# Patient Record
Sex: FEMALE | Race: WHITE | Employment: OTHER | URBAN - METROPOLITAN AREA
[De-identification: names, ages, dates, MRNs, and addresses within clinical notes are randomized per-mention and may not be internally consistent; named-entity substitution may affect disease eponyms.]

---

## 2021-01-19 PROBLEM — R97.0 ELEVATED CEA: Status: ACTIVE | Noted: 2020-05-20

## 2021-01-19 PROBLEM — K86.1 IDIOPATHIC CHRONIC PANCREATITIS (HCC): Status: ACTIVE | Noted: 2020-11-18

## 2021-01-19 PROBLEM — K86.89 PANCREATIC MASS: Status: ACTIVE | Noted: 2020-03-24

## 2021-01-19 PROBLEM — K83.8 DILATED BILE DUCT: Status: ACTIVE | Noted: 2020-11-18

## 2021-06-14 PROBLEM — K86.89 DILATED PANCREATIC DUCT: Status: ACTIVE | Noted: 2020-11-18

## 2022-02-16 ENCOUNTER — ANESTHESIA EVENT (OUTPATIENT)
Dept: OPERATING ROOM | Age: 77
End: 2022-02-16
Payer: MEDICARE

## 2022-02-17 ENCOUNTER — ANESTHESIA (OUTPATIENT)
Dept: OPERATING ROOM | Age: 77
End: 2022-02-17
Payer: MEDICARE

## 2022-02-17 ENCOUNTER — HOSPITAL ENCOUNTER (OUTPATIENT)
Age: 77
Setting detail: OUTPATIENT SURGERY
Discharge: HOME | End: 2022-02-17
Attending: OPHTHALMOLOGY | Admitting: OPHTHALMOLOGY
Payer: MEDICARE

## 2022-02-17 VITALS
SYSTOLIC BLOOD PRESSURE: 174 MMHG | BODY MASS INDEX: 28.72 KG/M2 | OXYGEN SATURATION: 97 % | WEIGHT: 152 LBS | HEART RATE: 71 BPM | TEMPERATURE: 97.2 F | RESPIRATION RATE: 18 BRPM | DIASTOLIC BLOOD PRESSURE: 80 MMHG

## 2022-02-17 VITALS — DIASTOLIC BLOOD PRESSURE: 89 MMHG | SYSTOLIC BLOOD PRESSURE: 173 MMHG | OXYGEN SATURATION: 100 %

## 2022-02-17 PROBLEM — H33.001 MACULA-OFF RHEGMATOGENOUS RETINAL DETACHMENT, RIGHT: Status: ACTIVE | Noted: 2022-02-17

## 2022-02-17 LAB
GFR NON-AFRICAN AMERICAN: 35 ML/MIN
GFR SERPL CREATININE-BSD FRML MDRD: 43 ML/MIN
GFR SERPL CREATININE-BSD FRML MDRD: ABNORMAL ML/MIN/{1.73_M2}
GLUCOSE BLD-MCNC: 108 MG/DL (ref 74–100)
POC BUN: 20 MG/DL (ref 8–26)
POC CREATININE: 1.45 MG/DL (ref 0.51–1.19)

## 2022-02-17 PROCEDURE — 2500000003 HC RX 250 WO HCPCS: Performed by: OPHTHALMOLOGY

## 2022-02-17 PROCEDURE — 3700000000 HC ANESTHESIA ATTENDED CARE: Performed by: OPHTHALMOLOGY

## 2022-02-17 PROCEDURE — 6370000000 HC RX 637 (ALT 250 FOR IP): Performed by: OPHTHALMOLOGY

## 2022-02-17 PROCEDURE — 7100000010 HC PHASE II RECOVERY - FIRST 15 MIN: Performed by: OPHTHALMOLOGY

## 2022-02-17 PROCEDURE — 84520 ASSAY OF UREA NITROGEN: CPT

## 2022-02-17 PROCEDURE — 2709999900 HC NON-CHARGEABLE SUPPLY: Performed by: OPHTHALMOLOGY

## 2022-02-17 PROCEDURE — 93005 ELECTROCARDIOGRAM TRACING: CPT | Performed by: ANESTHESIOLOGY

## 2022-02-17 PROCEDURE — 6360000002 HC RX W HCPCS: Performed by: OPHTHALMOLOGY

## 2022-02-17 PROCEDURE — 2720000010 HC SURG SUPPLY STERILE: Performed by: OPHTHALMOLOGY

## 2022-02-17 PROCEDURE — 7100000011 HC PHASE II RECOVERY - ADDTL 15 MIN: Performed by: OPHTHALMOLOGY

## 2022-02-17 PROCEDURE — 2580000003 HC RX 258: Performed by: OPHTHALMOLOGY

## 2022-02-17 PROCEDURE — 3600000014 HC SURGERY LEVEL 4 ADDTL 15MIN: Performed by: OPHTHALMOLOGY

## 2022-02-17 PROCEDURE — 82947 ASSAY GLUCOSE BLOOD QUANT: CPT

## 2022-02-17 PROCEDURE — 6360000002 HC RX W HCPCS: Performed by: ANESTHESIOLOGY

## 2022-02-17 PROCEDURE — 2580000003 HC RX 258: Performed by: ANESTHESIOLOGY

## 2022-02-17 PROCEDURE — 3600000004 HC SURGERY LEVEL 4 BASE: Performed by: OPHTHALMOLOGY

## 2022-02-17 PROCEDURE — 6360000002 HC RX W HCPCS

## 2022-02-17 PROCEDURE — 82565 ASSAY OF CREATININE: CPT

## 2022-02-17 PROCEDURE — 3700000001 HC ADD 15 MINUTES (ANESTHESIA): Performed by: OPHTHALMOLOGY

## 2022-02-17 PROCEDURE — 2500000003 HC RX 250 WO HCPCS

## 2022-02-17 RX ORDER — SODIUM CHLORIDE, SODIUM LACTATE, POTASSIUM CHLORIDE, CALCIUM CHLORIDE 600; 310; 30; 20 MG/100ML; MG/100ML; MG/100ML; MG/100ML
INJECTION, SOLUTION INTRAVENOUS CONTINUOUS PRN
Status: DISCONTINUED | OUTPATIENT
Start: 2022-02-17 | End: 2022-02-17 | Stop reason: SDUPTHER

## 2022-02-17 RX ORDER — CYCLOPENTOLATE HYDROCHLORIDE 10 MG/ML
1 SOLUTION/ DROPS OPHTHALMIC EVERY 5 MIN PRN
Status: DISCONTINUED | OUTPATIENT
Start: 2022-02-17 | End: 2022-02-17 | Stop reason: HOSPADM

## 2022-02-17 RX ORDER — CEFTAZIDIME 1 G/1
INJECTION, POWDER, FOR SOLUTION INTRAMUSCULAR; INTRAVENOUS PRN
Status: DISCONTINUED | OUTPATIENT
Start: 2022-02-17 | End: 2022-02-17 | Stop reason: ALTCHOICE

## 2022-02-17 RX ORDER — SODIUM CHLORIDE 0.9 % (FLUSH) 0.9 %
5-40 SYRINGE (ML) INJECTION EVERY 12 HOURS SCHEDULED
Status: DISCONTINUED | OUTPATIENT
Start: 2022-02-17 | End: 2022-02-17 | Stop reason: HOSPADM

## 2022-02-17 RX ORDER — PROPOFOL 10 MG/ML
INJECTION, EMULSION INTRAVENOUS PRN
Status: DISCONTINUED | OUTPATIENT
Start: 2022-02-17 | End: 2022-02-17 | Stop reason: SDUPTHER

## 2022-02-17 RX ORDER — PHENYLEPHRINE HYDROCHLORIDE 100 MG/ML
1 SOLUTION/ DROPS OPHTHALMIC EVERY 5 MIN PRN
Status: DISCONTINUED | OUTPATIENT
Start: 2022-02-17 | End: 2022-02-17 | Stop reason: HOSPADM

## 2022-02-17 RX ORDER — ERYTHROMYCIN 5 MG/G
OINTMENT OPHTHALMIC PRN
Status: DISCONTINUED | OUTPATIENT
Start: 2022-02-17 | End: 2022-02-17 | Stop reason: ALTCHOICE

## 2022-02-17 RX ORDER — HYDRALAZINE HYDROCHLORIDE 20 MG/ML
5 INJECTION INTRAMUSCULAR; INTRAVENOUS EVERY 5 MIN PRN
Status: COMPLETED | OUTPATIENT
Start: 2022-02-17 | End: 2022-02-17

## 2022-02-17 RX ORDER — TOBRAMYCIN AND DEXAMETHASONE 3; 1 MG/ML; MG/ML
1 SUSPENSION/ DROPS OPHTHALMIC
Status: DISCONTINUED | OUTPATIENT
Start: 2022-02-17 | End: 2022-02-17 | Stop reason: HOSPADM

## 2022-02-17 RX ORDER — SODIUM CHLORIDE 0.9 % (FLUSH) 0.9 %
5-40 SYRINGE (ML) INJECTION PRN
Status: DISCONTINUED | OUTPATIENT
Start: 2022-02-17 | End: 2022-02-17 | Stop reason: HOSPADM

## 2022-02-17 RX ORDER — LIDOCAINE HYDROCHLORIDE 10 MG/ML
INJECTION, SOLUTION EPIDURAL; INFILTRATION; INTRACAUDAL; PERINEURAL PRN
Status: DISCONTINUED | OUTPATIENT
Start: 2022-02-17 | End: 2022-02-17 | Stop reason: SDUPTHER

## 2022-02-17 RX ORDER — CYCLOPENTOLATE HYDROCHLORIDE 10 MG/ML
SOLUTION/ DROPS OPHTHALMIC PRN
Status: DISCONTINUED | OUTPATIENT
Start: 2022-02-17 | End: 2022-02-17 | Stop reason: ALTCHOICE

## 2022-02-17 RX ORDER — SODIUM CHLORIDE 9 MG/ML
25 INJECTION, SOLUTION INTRAVENOUS PRN
Status: DISCONTINUED | OUTPATIENT
Start: 2022-02-17 | End: 2022-02-17 | Stop reason: HOSPADM

## 2022-02-17 RX ADMIN — SODIUM CHLORIDE, POTASSIUM CHLORIDE, SODIUM LACTATE AND CALCIUM CHLORIDE: 600; 310; 30; 20 INJECTION, SOLUTION INTRAVENOUS at 13:45

## 2022-02-17 RX ADMIN — HYDRALAZINE HYDROCHLORIDE 5 MG: 20 INJECTION INTRAMUSCULAR; INTRAVENOUS at 14:54

## 2022-02-17 RX ADMIN — HYDRALAZINE HYDROCHLORIDE 5 MG: 20 INJECTION INTRAMUSCULAR; INTRAVENOUS at 14:59

## 2022-02-17 RX ADMIN — CYCLOPENTOLATE HYDROCHLORIDE 1 DROP: 10 SOLUTION/ DROPS OPHTHALMIC at 12:09

## 2022-02-17 RX ADMIN — PHENYLEPHRINE HYDROCHLORIDE 1 DROP: 100 SOLUTION/ DROPS OPHTHALMIC at 12:00

## 2022-02-17 RX ADMIN — PHENYLEPHRINE HYDROCHLORIDE 1 DROP: 100 SOLUTION/ DROPS OPHTHALMIC at 12:09

## 2022-02-17 RX ADMIN — PROPOFOL 60 MG: 10 INJECTION, EMULSION INTRAVENOUS at 13:45

## 2022-02-17 RX ADMIN — CYCLOPENTOLATE HYDROCHLORIDE 1 DROP: 10 SOLUTION/ DROPS OPHTHALMIC at 12:00

## 2022-02-17 RX ADMIN — LIDOCAINE HYDROCHLORIDE 50 MG: 10 INJECTION, SOLUTION EPIDURAL; INFILTRATION; INTRACAUDAL; PERINEURAL at 13:45

## 2022-02-17 ASSESSMENT — PAIN SCALES - GENERAL
PAINLEVEL_OUTOF10: 0

## 2022-02-17 ASSESSMENT — PULMONARY FUNCTION TESTS
PIF_VALUE: 0

## 2022-02-17 NOTE — PROGRESS NOTES
RN called Dr. Eliceo Robert to update him on current BP after giving hydralazine x2. Okay to go home.

## 2022-02-17 NOTE — ANESTHESIA PRE PROCEDURE
Department of Anesthesiology  Preprocedure Note       Name:  Eric Hammonds   Age:  78 y.o.  :  1945                                          MRN:  [id number]         Date:  2022      Surgeon: Shyanne Fink):  Nany Richter MD    Procedure: Procedure(s):  VITRECTOMY 23 GAUGE, GAS FLUID EXCHANGE, ENDOLASER,  POSSIBLE SCLERAL BUCKLE, POSSIBLE SILICONE OIL INJECTION    Medications prior to admission:   Prior to Admission medications    Medication Sig Start Date End Date Taking?  Authorizing Provider   allopurinol (ZYLOPRIM) 100 MG tablet Take 1 tablet by mouth daily 21  Yes MARLENY García CNP   losartan (COZAAR) 50 MG tablet Take 1 tablet by mouth 2 times daily 21  Yes MARLENY García CNP   metoprolol tartrate (LOPRESSOR) 50 MG tablet Take 1 tablet by mouth 2 times daily 21  Yes MARLENY García CNP   SYNTHROID 25 MCG tablet Take 1 tablet by mouth every other day 21  Yes Gamaliel Boyle MD   SYNTHROID 175 MCG tablet Take 1 tablet by mouth Daily 21  Yes Gamaliel Boyle MD   acetaminophen (TYLENOL) 500 MG tablet Take by mouth   Yes Historical Provider, MD   tiZANidine (ZANAFLEX) 2 MG tablet Take 1 tablet by mouth nightly as needed (muscle spasms) 21  Yes MARLENY García CNP   aspirin 81 MG EC tablet Take 81 mg by mouth daily   Yes Historical Provider, MD   Multiple Vitamins-Minerals (PRESERVISION AREDS 2+MULTI VIT) CAPS 1 capsule   Yes Historical Provider, MD       Current medications:    Current Facility-Administered Medications   Medication Dose Route Frequency Provider Last Rate Last Admin    tobramycin-dexamethasone (TOBRADEX) ophthalmic suspension 1 drop  1 drop [facility] On [facility] to [facility] Reina Rogel MD        phenylephrine (VERÓNICA-SYNEPHRINE) 10 % ophthalmic solution 1 drop  1 drop [facility] Q5 Min PRN Nany Richter MD   1 drop at 22 1209    cyclopentolate (CYCLOGYL) 1 % ophthalmic solution 1 drop  1 drop Right [facility] Q5 Min PRN Marilia Villa Mariluz Johnson MD   1 drop at 22 1209       Allergies:     Allergies   Allergen Reactions    Norvasc [Amlodipine]     Vytorin [Ezetimibe-Simvastatin]        Problem List:    Patient Active Problem List   Diagnosis Code    Mass of breast N63.0    Other long term (current) drug therapy Z79.899    Hyperuricemia E79.0    Jaundice R17    Gout M10.9    Disc degeneration, lumbar M51.36    Osteoarthritis M19.90    Hearing loss H91.90    Hypothyroidism E03.9    Calculus in biliary tract K80.20    Hypertension I10    Dilated bile duct K83.8    Elevated CEA R97.0    Idiopathic chronic pancreatitis (HCC) K86.1    Pancreatic mass K86.89    Dilated pancreatic duct K86.89    Macula-off rhegmatogenous retinal detachment, 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       Past Medical History:        Diagnosis Date    Calculus in biliary tract     Disc degeneration, lumbar     Gout     Hearing loss     Hypertension     Hyperuricemia     Hypothyroidism     Jaundice     Mass of breast     Osteoarthritis     Other long term (current) drug therapy     Pancreatitis, chronic (HCC)        Past Surgical History:        Procedure Laterality Date    APPENDECTOMY      BACK SURGERY  2013    lumbar spinal fusion at [facility]  2016    left breast needle loc bx    CATARACT REMOVAL      bilateral     SECTION      x1    CHOLECYSTECTOMY      COLONOSCOPY N/A 2021    COLORECTAL CANCER SCREENING, HIGH RISK performed by Alexandre Yadav MD at [facility] ABDOMINAL         Social History:    Social History     Tobacco Use    Smoking status: Never Smoker    Smokeless tobacco: Never Used   Substance Use Topics    Alcohol use: Yes     Comment: occasional/social                                Counseling given: Not Answered      Vital Signs (Current):   Vitals:    22 1156   BP: (!) 204/95   Pulse: 69   Resp: 14   Temp: 97.7 °F (36.5 °C)   TempSrc: Temporal   SpO2: 98%   Weight: 152 lb (68.9 kg)                                              BP Readings from Last 3 Encounters:   02/17/22 (!) 204/95   12/14/21 (!) 153/93   06/29/21 (!) 147/79       NPO Status: Time of last liquid consumption: 1900                        Time of last solid consumption: 1900                        Date of last liquid consumption: 02/16/22                        Date of last solid food consumption: 02/16/22    BMI:   Wt Readings from Last 3 Encounters:   02/17/22 152 lb (68.9 kg)   12/14/21 151 lb 12.8 oz (68.9 kg)   06/29/21 149 lb 9.6 oz (67.9 kg)     Body mass index is 28.72 kg/m².     CBC:   Lab Results   Component Value Date    WBC 6.9 06/18/2021    RBC 3.71 06/18/2021    HGB 12.0 06/18/2021    HCT 36.0 06/18/2021    MCV 97 06/18/2021     06/18/2021       CMP:   Lab Results   Component Value Date     06/18/2021    K 4.9 06/18/2021     06/18/2021    CO2 23 06/18/2021    BUN 23 06/18/2021    CREATININE 1.45 02/17/2022    CREATININE 1.63 06/18/2021    LABGLOM 35 02/17/2022    GLUCOSE 111 06/18/2021    PROT 7.2 06/18/2021    CALCIUM 9.6 06/18/2021    BILITOT 1.4 06/18/2021    ALKPHOS 82 06/18/2021    AST 43 06/18/2021    ALT 43 06/18/2021       POC Tests:   Recent Labs     02/17/22  1200   POCGLU 108*   POCBUN 20       Coags:   Lab Results   Component Value Date    PROTIME 13.3 04/01/2021    INR 1.00 04/01/2021       HCG (If Applicable): No results found for: PREGTESTUR, PREGSERUM, HCG, HCGQUANT     ABGs: No results found for: PHART, PO2ART, HWY0VPQ, TXE4NIN, BEART, U9INRCDX     Type & Screen (If Applicable):  No results found for: LABABO, LABRH    Drug/Infectious Status (If Applicable):  No results found for: HIV, HEPCAB    COVID-19 Screening (If Applicable): No results found for: COVID19        Anesthesia Evaluation  Patient summary reviewed and Nursing notes reviewed no history of anesthetic complications:   Airway: Mallampati: II  TM distance: >3 FB   Neck ROM: full  Mouth opening: > = 3 FB Dental: normal exam         Pulmonary:Negative Pulmonary ROS and normal exam                               Cardiovascular:  Exercise tolerance: good (>4 METS),   (+) hypertension:,     (-) past MI, CAD, CABG/stent, dysrhythmias,  angina and  CHF      Rhythm: regular  Rate: normal           Beta Blocker:  Dose within 24 Hrs         Neuro/Psych:   Negative Neuro/Psych ROS              GI/Hepatic/Renal: Neg GI/Hepatic/Renal ROS            Endo/Other:    (+) hypothyroidism::., .                 Abdominal:             Vascular: Other Findings:             Anesthesia Plan      general     ASA 2       Induction: intravenous. MIPS: Postoperative opioids intended and Prophylactic antiemetics administered. Anesthetic plan and risks discussed with patient.       Plan discussed with CRNA and surgical team.                  Scout MD Rigo   2/17/2022

## 2022-02-17 NOTE — OP NOTE
Operative Note      Patient: Ken Causey  YOB: 1945  MRN: [id number]    Date of Procedure: 2022      Op Note    [unfilled]    Date of Birth:  @@    @MRN@    Pre-operative Diagnosis: RETINAL DETACHMENT RIGHT EYE    Post-operative Diagnosis: Same    Procedure: Vitrectomy, Gas-Fluid Exchange, Endolaser Right Eye    Anesthesia: [facility]    Surgeons/Assistants: Charis Duarte MD    Estimated Blood Loss: Less than 1 ML    Complications: None    Specimens: None    Reason for Operation:    Patient wishes to proceed with surgery in order to prevent further vision loss. Patient understands vision may not return to normal, and may take 6 - 12 months to reach maximum level. Patient understands risks include bleeding, infection and the possibility of recurrent vision loss. Procedure: The patient was brought to the operating room in good condition. Transient Propofol was given. Retrobulbar and topical anaesthesia were administered. The patient was prepped and draped in the usual manner. 25 gauge vitrectomy trocars were inserted in the usual quadrants. Infusion was inserted in the inferior temporal quadrant. Light pipe and ocutome placed through the superior trocars. Vitreous was removed from anterior to posterior and trimmed out past the equator in all quadrants. Scleral depression was done in the inferior 180 degrees to trim vitreous near the vitreous base. Scleral depression identified retinal break at 9 . A retinotomy was created near the posterior pole at 8:30 o'clock with intraocular diathermy. An air-fluid exchange reattached the retina nicely. Endolaser was placed around all retinal breaks, the retinotomy and 360 degrees from the equator to the ora except for 3 o'clock. Residual fluid was aspirated from over the optic nerve. The air was exchanged for 16% C3F8. The sites were sutured if necessary and SubTenons antibiotic injected in the inferior temporal quadrant.  The eye was patched in the usual fashion and the patient taken to the recovery room in good condition.       Electronically signed by Анна Paula MD

## 2022-02-17 NOTE — H&P
History and Physical    Pt Name: Winifred Lange  MRN: [id number]  YOB: 1945  Date of evaluation: 2/17/2022  Primary Care Physician: MARLENY Garza CNP    SUBJECTIVE:   History of Chief Complaint:    Winifred Lange is a 78 y.o. female who is scheduled today for VITRECTOMY 23 GAUGE, GAS FLUID EXCHANGE, ENDOLASER,  POSSIBLE SCLERAL BUCKLE, POSSIBLE SILICONE OIL INJECTION - Right. She complains of intermittent visual disturbances, \"spotting\" but significantly worse the past 2 weeks and describes a curtain over her vision, reports only some right lower visual field visibility. Allergies  is allergic to norvasc [amlodipine] and vytorin [ezetimibe-simvastatin]. Medications  Prior to Admission medications    Medication Sig Start Date End Date Taking?  Authorizing Provider   allopurinol (ZYLOPRIM) 100 MG tablet Take 1 tablet by mouth daily 12/14/21  Yes Millicent Duverney, APRN - CNP   losartan (COZAAR) 50 MG tablet Take 1 tablet by mouth 2 times daily 12/14/21  Yes Millicent Duverney, APRN - CNP   metoprolol tartrate (LOPRESSOR) 50 MG tablet Take 1 tablet by mouth 2 times daily 12/14/21  Yes Millicent Duverney, APRN - CNP   SYNTHROID 25 MCG tablet Take 1 tablet by mouth every other day 9/8/21  Yes Bobby Morrow MD   SYNTHROID 175 MCG tablet Take 1 tablet by mouth Daily 9/8/21  Yes oBbby Morrow MD   acetaminophen (TYLENOL) 500 MG tablet Take by mouth   Yes Historical Provider, MD   tiZANidine (ZANAFLEX) 2 MG tablet Take 1 tablet by mouth nightly as needed (muscle spasms) 6/14/21  Yes Millicent Duverney, APRN - CNP   aspirin 81 MG EC tablet Take 81 mg by mouth daily   Yes Historical Provider, MD   Multiple Vitamins-Minerals (PRESERVISION AREDS 2+MULTI VIT) CAPS 1 capsule   Yes Historical Provider, MD     Past Medical History    has a past medical history of Calculus in biliary tract, Disc degeneration, lumbar, Gout, Hearing loss, Hypertension, Hyperuricemia, Hypothyroidism, Jaundice, Mass of breast, Osteoarthritis, Other long term (current) drug therapy, and Pancreatitis, chronic ([facility] [address] Past Surgical History   has a past surgical history that includes Cataract removal; Hysterectomy, total abdominal; Appendectomy;  section; back surgery (); Breast surgery (2016); Cholecystectomy; and Colonoscopy (N/A, 2021). Social History   reports that she has never smoked. She has never used smokeless tobacco.   reports current alcohol use. reports no history of drug use. Marital Status    Children 3  Occupation retired nurse, ICU and ER, from [employer]  Family History  Family Status   Relation Name Status    Mother   at age 80    Father     [place] Sister     [address] Other  (Not Specified)     family history includes Breast Cancer in her sister; Cancer in her brother; Diabetes in her father and mother; Heart Attack in her father; Other in an other family member; Stroke in her mother. Review of Systems:  CONSTITUTIONAL:   negative for fevers, chills, fatigue and malaise    EYES:   See hpi   HEENT:   negative for tinnitus, epistaxis and sore throat     RESPIRATORY:   negative for cough, shortness of breath, wheezing     CARDIOVASCULAR:   negative for chest pain, palpitations, syncope, edema  +self reports white coat syndrome   GASTROINTESTINAL:   negative for nausea, vomiting     GENITOURINARY:   negative for incontinence     MUSCULOSKELETAL:   +chronic back pain   NEUROLOGICAL:   Negative for weakness and tingling  negative for headaches and dizziness     PSYCHIATRIC:   negative for anxiety       OBJECTIVE:   VITALS:  weight is 152 lb (68.9 kg). Her temporal temperature is 97.7 °F (36.5 °C). Her blood pressure is 204/95 (abnormal) and her pulse is 69. Her respiration is 14 and oxygen saturation is 98%. CONSTITUTIONAL:alert & oriented x 3, no acute distress. Mildly anxious affect. Very pleasant.    SKIN:  Warm and dry, no rashes on exposed areas of skin   HEAD:  Normocephalic, atraumatic   EYES: EOMs intact. Right pupil dilated s/p eye drops   EARS:  Equal bilaterally, no edema or thickening, skin is intact without lumps or lesions. No discharge. Hearing grossly WNL. NOSE:  Nares patent. No rhinorrhea   MOUTH/THROAT:  Mucous membranes moist, tongue is pink, uvula midline, teeth appear to be intact  NECK:supple, full ROM  LUNGS: Respirations even and non-labored. Clear to auscultation bilaterally, no wheezes, rales, or rhonchi. CARDIOVASCULAR: Regular rate and rhythm, no murmurs/rubs/gallops   ABDOMEN: soft, non-tender, non-distended, bowel sounds active x 4   EXTREMITIES: No edema bilateral lower extremities. No varicosities bilateral lower extremities. NEUROLOGIC: CN II-XII are grossly intact. Gait not assessed.    IMPRESSIONS:   Retinal detachment- OD  PLANS:   VITRECTOMY 23 GAUGE, GAS FLUID EXCHANGE, ENDOLASER,  POSSIBLE SCLERAL BUCKLE, POSSIBLE SILICONE OIL INJECTION - Right        MARLENY AMADOR - CNP   Electronically signed 2/17/2022 at 12:15 PM

## 2022-02-17 NOTE — ANESTHESIA POSTPROCEDURE EVALUATION
Department of Anesthesiology  Postprocedure Note    Patient: Rigoberto Kaufman  MRN: [id number]  YOB: 1945  Date of evaluation: 2/17/2022  Time:  5:08 PM     Procedure Summary     Date: 02/17/22 Room / Location: [address]    Anesthesia Start: 0883 Anesthesia Stop: 1235    Procedure: VITRECTOMY 23 GAUGE, AIR FLUID EXCHANGE, ENDOLASER 200MS 200MW 473 SPOTS, AIR GAS EXCHANGE WITH 16% C3F8 (Right Eye) Diagnosis: (RETINAL DETACHMENT RIGHT EYE)    Surgeons: Yehuda Talbot MD Responsible Provider: Tip Romano MD    Anesthesia Type: general ASA Status: 2          Anesthesia Type: general    Linda Phase I:      Linda Phase II: Linda Score: 10    Last vitals: Reviewed and per EMR flowsheets.        Anesthesia Post Evaluation    Patient location during evaluation: PACU  Patient participation: complete - patient participated  Level of consciousness: awake and alert  Pain score: 0  Airway patency: patent  Nausea & Vomiting: no nausea and no vomiting  Complications: no  Cardiovascular status: hemodynamically stable  Respiratory status: room air  Hydration status: euvolemic

## 2022-02-17 NOTE — PROGRESS NOTES
RN spoke to Dr. Jaron Pagan regarding pts elevated BP. She stated that it is always like this because she doesn't absorb her medications well. He stated to give 5mg of hydralazine every 5 mins for 2 doses.

## 2022-02-18 LAB
EKG ATRIAL RATE: 67 BPM
EKG P AXIS: 8 DEGREES
EKG P-R INTERVAL: 184 MS
EKG Q-T INTERVAL: 462 MS
EKG QRS DURATION: 128 MS
EKG QTC CALCULATION (BAZETT): 488 MS
EKG R AXIS: -19 DEGREES
EKG T AXIS: -2 DEGREES
EKG VENTRICULAR RATE: 67 BPM

## 2022-07-05 PROBLEM — N18.30 STAGE 3 CHRONIC KIDNEY DISEASE (HCC): Status: ACTIVE | Noted: 2022-07-05

## 2022-07-05 PROBLEM — R73.9 HYPERGLYCEMIA: Status: ACTIVE | Noted: 2022-07-05

## 2022-08-01 PROBLEM — N18.4 CKD (CHRONIC KIDNEY DISEASE) STAGE 4, GFR 15-29 ML/MIN (HCC): Status: ACTIVE | Noted: 2022-08-01

## 2022-08-05 PROBLEM — Z91.89 COLON CANCER HIGH RISK: Status: ACTIVE | Noted: 2022-08-05

## 2022-09-06 PROBLEM — D12.6 TUBULAR ADENOMA OF COLON: Status: ACTIVE | Noted: 2022-09-06

## 2022-09-27 PROBLEM — R00.2 PALPITATIONS: Status: ACTIVE | Noted: 2022-09-27

## (undated) DEVICE — 23GA ACTU8 ADAPTIVE FORCEPS BOX OF 5: Brand: VORTEX SURGICAL INC

## (undated) DEVICE — SYRINGE, LUER LOCK, 10ML: Brand: MEDLINE

## (undated) DEVICE — REVOLUTION DSP 23G CURVED SCISSORS: Brand: ALCON GRIESHABER REVOLUTION

## (undated) DEVICE — 1 EACH 40411 STERILE DISPOSABLE SUPER VIEW® LENS SET & 1 EACH 40100 STERILE MICROSCOPE DRAPE: Brand: SUPER VIEW® PACK

## (undated) DEVICE — OCCUCOAT SYRINGE 1ML 6PK: Brand: OCCUCOAT

## (undated) DEVICE — SOLUTION IRRIG 1000ML PENTALYTE PLAS POUR BTL TIS U SOL

## (undated) DEVICE — CYCLOGEL 1% GTTS

## (undated) DEVICE — SUTURE PLN GUT SZ 6-0 L18IN ABSRB YELLOWISH TAN L6.5MM 1735G

## (undated) DEVICE — SYRINGE ST FILTERS W/MCE MEMBRAN

## (undated) DEVICE — STANDARD HYPODERMIC NEEDLE,ALUMINUM HUB: Brand: MONOJECT

## (undated) DEVICE — CAUTERY SURG 23GA BPLR 6 PER BX

## (undated) DEVICE — 23 GA ULTRAVIT® VITRECTOMY PROBE: Brand: CONSTELLATION® ULTRAVIT® ENGAUGE®

## (undated) DEVICE — 23GA EZPASS SOFT TIP CANNULA BOX OF 5: Brand: VORTEX SURGICAL INC

## (undated) DEVICE — 23 GA FLEXIBLE TIP LASER PROBE: Brand: ALCON, ENGAUGE

## (undated) DEVICE — SYRINGE MED 5ML STD CLR PLAS LUERLOCK TIP N CTRL DISP

## (undated) DEVICE — RETINA PK

## (undated) DEVICE — OCCLUDER EYE POLYETH HYPOALRG ADH TAPE W/O PINHOLE

## (undated) DEVICE — MVR KNIFE 25 GAUGE: Brand: ALCON

## (undated) DEVICE — SURGICAL PROCEDURE PACK 23 GA VITRECTOMY